# Patient Record
Sex: FEMALE | Race: WHITE | NOT HISPANIC OR LATINO | Employment: PART TIME | URBAN - METROPOLITAN AREA
[De-identification: names, ages, dates, MRNs, and addresses within clinical notes are randomized per-mention and may not be internally consistent; named-entity substitution may affect disease eponyms.]

---

## 2020-02-04 ENCOUNTER — OFFICE VISIT - HEALTHEAST (OUTPATIENT)
Dept: SURGERY | Facility: CLINIC | Age: 60
End: 2020-02-04

## 2020-02-04 ENCOUNTER — AMBULATORY - HEALTHEAST (OUTPATIENT)
Dept: LAB | Facility: CLINIC | Age: 60
End: 2020-02-04

## 2020-02-04 DIAGNOSIS — E88.810 METABOLIC SYNDROME: ICD-10-CM

## 2020-02-04 DIAGNOSIS — E78.5 DYSLIPIDEMIA: ICD-10-CM

## 2020-02-04 DIAGNOSIS — M19.90 ARTHRITIS: ICD-10-CM

## 2020-02-04 DIAGNOSIS — R13.10 DYSPHAGIA, UNSPECIFIED TYPE: ICD-10-CM

## 2020-02-04 DIAGNOSIS — K76.0 FATTY LIVER: ICD-10-CM

## 2020-02-04 DIAGNOSIS — Z98.84 HISTORY OF LAPAROSCOPIC ADJUSTABLE GASTRIC BANDING: ICD-10-CM

## 2020-02-04 DIAGNOSIS — E11.9 TYPE 2 DIABETES MELLITUS WITHOUT COMPLICATION, WITHOUT LONG-TERM CURRENT USE OF INSULIN (H): ICD-10-CM

## 2020-02-04 DIAGNOSIS — N39.3 STRESS INCONTINENCE OF URINE: ICD-10-CM

## 2020-02-04 DIAGNOSIS — R11.10 VOMITING, INTRACTABILITY OF VOMITING NOT SPECIFIED, PRESENCE OF NAUSEA NOT SPECIFIED, UNSPECIFIED VOMITING TYPE: ICD-10-CM

## 2020-02-04 DIAGNOSIS — K74.60 CIRRHOSIS OF LIVER WITHOUT ASCITES, UNSPECIFIED HEPATIC CIRRHOSIS TYPE (H): ICD-10-CM

## 2020-02-04 DIAGNOSIS — F32.A DEPRESSION, UNSPECIFIED DEPRESSION TYPE: ICD-10-CM

## 2020-02-04 DIAGNOSIS — K21.9 GASTROESOPHAGEAL REFLUX DISEASE WITHOUT ESOPHAGITIS: ICD-10-CM

## 2020-02-04 DIAGNOSIS — M54.50 LOW BACK PAIN, UNSPECIFIED BACK PAIN LATERALITY, UNSPECIFIED CHRONICITY, UNSPECIFIED WHETHER SCIATICA PRESENT: ICD-10-CM

## 2020-02-04 LAB
ERYTHROCYTE [DISTWIDTH] IN BLOOD BY AUTOMATED COUNT: 13.3 % (ref 11–14.5)
FERRITIN SERPL-MCNC: 9 NG/ML (ref 10–130)
FOLATE SERPL-MCNC: 15.1 NG/ML
HCT VFR BLD AUTO: 32.8 % (ref 35–47)
HGB BLD-MCNC: 10.7 G/DL (ref 12–16)
MCH RBC QN AUTO: 25.5 PG (ref 27–34)
MCHC RBC AUTO-ENTMCNC: 32.6 G/DL (ref 32–36)
MCV RBC AUTO: 78 FL (ref 80–100)
PLATELET # BLD AUTO: 170 THOU/UL (ref 140–440)
PMV BLD AUTO: 13.1 FL (ref 8.5–12.5)
PTH-INTACT SERPL-MCNC: 70 PG/ML (ref 10–86)
RBC # BLD AUTO: 4.2 MILL/UL (ref 3.8–5.4)
TSH SERPL DL<=0.005 MIU/L-ACNC: 2.6 UIU/ML (ref 0.3–5)
VIT B12 SERPL-MCNC: 394 PG/ML (ref 213–816)
WBC: 6.3 THOU/UL (ref 4–11)

## 2020-02-04 RX ORDER — BUPROPION HYDROCHLORIDE 150 MG/1
150 TABLET ORAL
Status: SHIPPED | COMMUNITY
Start: 2019-10-03

## 2020-02-04 RX ORDER — ESCITALOPRAM OXALATE 20 MG/1
20 TABLET ORAL
Status: SHIPPED | COMMUNITY
Start: 2019-10-03

## 2020-02-04 RX ORDER — GLIPIZIDE 5 MG/1
TABLET, FILM COATED, EXTENDED RELEASE ORAL
Status: SHIPPED | COMMUNITY
Start: 2019-11-04

## 2020-02-04 RX ORDER — HYDROCHLOROTHIAZIDE 12.5 MG/1
12.5 TABLET ORAL
Status: SHIPPED | COMMUNITY
Start: 2019-09-30 | End: 2020-09-29

## 2020-02-04 RX ORDER — METOPROLOL SUCCINATE 100 MG/1
100 TABLET, EXTENDED RELEASE ORAL
Status: SHIPPED | COMMUNITY
Start: 2019-09-30

## 2020-02-04 RX ORDER — PRAVASTATIN SODIUM 20 MG
20 TABLET ORAL
Status: SHIPPED | COMMUNITY
Start: 2019-09-30

## 2020-02-04 RX ORDER — OXYBUTYNIN CHLORIDE 10 MG/1
TABLET, EXTENDED RELEASE ORAL
Status: SHIPPED | COMMUNITY
Start: 2019-11-04

## 2020-02-04 ASSESSMENT — MIFFLIN-ST. JEOR: SCORE: 1582.4

## 2020-02-05 LAB
25(OH)D3 SERPL-MCNC: 58.9 NG/ML (ref 30–80)
C PEPTIDE SERPL-MCNC: 6.7 NG/ML (ref 0.9–6.9)
ZINC SERPL-MCNC: 73.3 UG/DL (ref 60–120)

## 2020-02-06 LAB
ANNOTATION COMMENT IMP: NORMAL
VIT A SERPL-MCNC: 0.41 MG/L (ref 0.3–1.2)
VIT B1 PYROPHOSHATE BLD-SCNC: 87 NMOL/L (ref 70–180)
VITAMIN A (RETINYL PALMITATE): 0.03 MG/L (ref 0–0.1)

## 2020-02-07 ENCOUNTER — COMMUNICATION - HEALTHEAST (OUTPATIENT)
Dept: SURGERY | Facility: CLINIC | Age: 60
End: 2020-02-07

## 2020-02-12 ENCOUNTER — AMBULATORY - HEALTHEAST (OUTPATIENT)
Dept: SURGERY | Facility: CLINIC | Age: 60
End: 2020-02-12

## 2020-03-04 ENCOUNTER — OFFICE VISIT - HEALTHEAST (OUTPATIENT)
Dept: SURGERY | Facility: CLINIC | Age: 60
End: 2020-03-04

## 2020-03-04 DIAGNOSIS — E66.01 MORBID OBESITY (H): ICD-10-CM

## 2020-03-05 ENCOUNTER — OFFICE VISIT - HEALTHEAST (OUTPATIENT)
Dept: SURGERY | Facility: CLINIC | Age: 60
End: 2020-03-05

## 2020-03-05 DIAGNOSIS — E66.812 OBESITY, CLASS II, BMI 35-39.9, ISOLATED (SEE ACTUAL BMI): ICD-10-CM

## 2020-03-05 DIAGNOSIS — E11.9 TYPE 2 DIABETES MELLITUS WITHOUT COMPLICATION, WITHOUT LONG-TERM CURRENT USE OF INSULIN (H): ICD-10-CM

## 2020-03-05 ASSESSMENT — MIFFLIN-ST. JEOR: SCORE: 1597.37

## 2020-03-18 ENCOUNTER — OFFICE VISIT - HEALTHEAST (OUTPATIENT)
Dept: SURGERY | Facility: CLINIC | Age: 60
End: 2020-03-18

## 2020-03-18 DIAGNOSIS — E66.01 MORBID OBESITY (H): ICD-10-CM

## 2020-04-01 ENCOUNTER — OFFICE VISIT - HEALTHEAST (OUTPATIENT)
Dept: SURGERY | Facility: CLINIC | Age: 60
End: 2020-04-01

## 2020-04-01 DIAGNOSIS — E66.812 OBESITY, CLASS II, BMI 35-39.9, ISOLATED (SEE ACTUAL BMI): ICD-10-CM

## 2020-04-01 DIAGNOSIS — K76.0 FATTY LIVER: ICD-10-CM

## 2020-04-01 DIAGNOSIS — Z71.3 NUTRITIONAL COUNSELING: ICD-10-CM

## 2020-04-01 DIAGNOSIS — E11.9 TYPE 2 DIABETES MELLITUS WITHOUT COMPLICATION, WITHOUT LONG-TERM CURRENT USE OF INSULIN (H): ICD-10-CM

## 2020-04-01 ASSESSMENT — MIFFLIN-ST. JEOR: SCORE: 1573.33

## 2020-05-22 ENCOUNTER — SURGERY - HEALTHEAST (OUTPATIENT)
Dept: SURGERY | Facility: CLINIC | Age: 60
End: 2020-05-22

## 2020-05-22 DIAGNOSIS — Z12.11 SPECIAL SCREENING FOR MALIGNANT NEOPLASMS, COLON: ICD-10-CM

## 2020-05-27 ENCOUNTER — AMBULATORY - HEALTHEAST (OUTPATIENT)
Dept: SURGERY | Facility: CLINIC | Age: 60
End: 2020-05-27

## 2020-05-27 DIAGNOSIS — Z11.59 ENCOUNTER FOR SCREENING FOR OTHER VIRAL DISEASES: ICD-10-CM

## 2020-05-28 ASSESSMENT — MIFFLIN-ST. JEOR: SCORE: 1589.2

## 2020-05-31 ENCOUNTER — AMBULATORY - HEALTHEAST (OUTPATIENT)
Dept: FAMILY MEDICINE | Facility: CLINIC | Age: 60
End: 2020-05-31

## 2020-05-31 DIAGNOSIS — Z11.59 ENCOUNTER FOR SCREENING FOR OTHER VIRAL DISEASES: ICD-10-CM

## 2020-06-02 ENCOUNTER — ANESTHESIA - HEALTHEAST (OUTPATIENT)
Dept: SURGERY | Facility: AMBULATORY SURGERY CENTER | Age: 60
End: 2020-06-02

## 2020-06-03 ENCOUNTER — SURGERY - HEALTHEAST (OUTPATIENT)
Dept: SURGERY | Facility: AMBULATORY SURGERY CENTER | Age: 60
End: 2020-06-03

## 2020-06-03 ASSESSMENT — MIFFLIN-ST. JEOR: SCORE: 1575.59

## 2020-06-08 ENCOUNTER — COMMUNICATION - HEALTHEAST (OUTPATIENT)
Dept: SURGERY | Facility: CLINIC | Age: 60
End: 2020-06-08

## 2020-06-09 ENCOUNTER — AMBULATORY - HEALTHEAST (OUTPATIENT)
Dept: SURGERY | Facility: CLINIC | Age: 60
End: 2020-06-09

## 2020-06-19 ENCOUNTER — COMMUNICATION - HEALTHEAST (OUTPATIENT)
Dept: SURGERY | Facility: CLINIC | Age: 60
End: 2020-06-19

## 2020-06-25 ENCOUNTER — AMBULATORY - HEALTHEAST (OUTPATIENT)
Dept: SURGERY | Facility: CLINIC | Age: 60
End: 2020-06-25

## 2020-07-16 ENCOUNTER — OFFICE VISIT - HEALTHEAST (OUTPATIENT)
Dept: SURGERY | Facility: CLINIC | Age: 60
End: 2020-07-16

## 2020-07-16 ENCOUNTER — AMBULATORY - HEALTHEAST (OUTPATIENT)
Dept: SURGERY | Facility: CLINIC | Age: 60
End: 2020-07-16

## 2020-07-16 DIAGNOSIS — K21.9 GASTROESOPHAGEAL REFLUX DISEASE WITHOUT ESOPHAGITIS: ICD-10-CM

## 2020-07-16 DIAGNOSIS — E88.810 METABOLIC SYNDROME: ICD-10-CM

## 2020-07-16 DIAGNOSIS — K74.60 CIRRHOSIS OF LIVER WITHOUT ASCITES, UNSPECIFIED HEPATIC CIRRHOSIS TYPE (H): ICD-10-CM

## 2020-07-16 DIAGNOSIS — E11.9 TYPE 2 DIABETES MELLITUS WITHOUT COMPLICATION, WITHOUT LONG-TERM CURRENT USE OF INSULIN (H): ICD-10-CM

## 2020-07-16 DIAGNOSIS — Z98.84 HISTORY OF LAPAROSCOPIC ADJUSTABLE GASTRIC BANDING: ICD-10-CM

## 2020-07-16 RX ORDER — METFORMIN HYDROCHLORIDE 750 MG/1
2250 TABLET, EXTENDED RELEASE ORAL
Status: SHIPPED | COMMUNITY
Start: 2020-07-07

## 2020-07-16 ASSESSMENT — MIFFLIN-ST. JEOR: SCORE: 1611.88

## 2020-07-30 ENCOUNTER — COMMUNICATION - HEALTHEAST (OUTPATIENT)
Dept: SURGERY | Facility: CLINIC | Age: 60
End: 2020-07-30

## 2020-07-31 ENCOUNTER — COMMUNICATION - HEALTHEAST (OUTPATIENT)
Dept: SURGERY | Facility: CLINIC | Age: 60
End: 2020-07-31

## 2021-06-04 VITALS — BODY MASS INDEX: 36.82 KG/M2 | WEIGHT: 221 LBS | HEIGHT: 65 IN

## 2021-06-04 VITALS
DIASTOLIC BLOOD PRESSURE: 72 MMHG | HEIGHT: 66 IN | BODY MASS INDEX: 36.32 KG/M2 | SYSTOLIC BLOOD PRESSURE: 110 MMHG | WEIGHT: 226 LBS

## 2021-06-04 VITALS
RESPIRATION RATE: 14 BRPM | DIASTOLIC BLOOD PRESSURE: 66 MMHG | BODY MASS INDEX: 37.15 KG/M2 | HEART RATE: 60 BPM | WEIGHT: 223 LBS | OXYGEN SATURATION: 97 % | HEIGHT: 65 IN | SYSTOLIC BLOOD PRESSURE: 122 MMHG

## 2021-06-04 VITALS — WEIGHT: 226.3 LBS | HEIGHT: 65 IN | BODY MASS INDEX: 37.7 KG/M2

## 2021-06-04 VITALS — WEIGHT: 218 LBS | BODY MASS INDEX: 35.03 KG/M2 | HEIGHT: 66 IN

## 2021-06-05 NOTE — PATIENT INSTRUCTIONS - HE
Considering Weight Loss Surgery? Go to [email or web address] - then follow instructions under [facility]inar to watch our video.

## 2021-06-05 NOTE — PROGRESS NOTES
"New Bariatric Surgery Consultation Note    2020    RE: Pat Barraza  MR#: [id number]  : 1960      Referring provider: Dr. Germain Baxter  Chief Complaint/Reason for visit: evaluation for possible revisional weight loss surgery: Lap band to RYGB    Dear Sahara Brand CNP,    I had the pleasure of seeing your patient, Pat Barraza, to evaluate her obesity and consider her for possible weight loss surgery. As you know, Pat Barraza is 59 y.o..  She has a height of Height: 5' 5\" (1.651 m)  , a weight of 233 pounds. Her BMI is 37.11        HISTORY OF PRESENT ILLNESS:  Pat had a lap band placed by Dr. Jackson in . Her weight was 232# and her BMI was 37.4. She had FLAKITA and HTN prior to her lap band.   She has developed maladaptive eating, dysphagia and vomiting. She saw [facility] and had all of the saline removed from her lap band. The fluid was cloudy.  She also had an EGD 2020 which is in EPIC which did not show band erosion or ulceration and H. Pylori was negative. There was no mention of esophageal varices.  She was put on PPI and carafate which she continues now. She has been taking vitamin D and was taking a MVI. She developed DM2 4 yrs after her surgery. Her A1c was 5.8 recently on glipizide 5mg daily and metformin 2250 mg daily.    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     LIMITED ABDOMINAL ULTRASOUND (FIBROSCAN)    DATE: 2020 8:58 AM        INDICATION: Nonalcoholic fatty liver disease    COMPARISON: CT 2020        FINDINGS:     Transient ultrasound elastography performed and measurements were obtained of the liver. 11 total measurements were obtained and 11 measurements were utilized. IQR is 14%.        Liver stiffness is measured at 22.1 kPa which is consistent with F4, cirrhosis.        The CAP is 271 dB/M which is consistent with S2, moderate steatosis.            CO-MORBIDITIES OF OBESITY INCLUDE:  Patient Active Problem List "   Diagnosis     Depression     Arthritis     Fatty liver     GERD (gastroesophageal reflux disease)     Urinary incontinence     Dyslipidemia     Diabetes mellitus (H)     Low back pain     Metabolic syndrome     Low iron stores     Low serum vitamin B12 < 400     Iron deficiency anemia     Cirrhosis (H)       In summary, Pat Barraza has had a Lap Band placed by Dr. Jackson in 2011. She developed maladaptive eating and vomiting and dysphagia and has had the fluid removed in its entirety by MD at [facility]. She has morbid obesity  with a BMI 37.11 kg/m2 and the comorbidities stated above. She completed an informational seminar and would like a revision from Lap Band to RYGB. She hopes to see resolution of her vomiting, maladaptive eating, and DM.  Once Pat has been cleared by the dietitian and the psychologist, completed her initial labs, attended one support group, had her pap done, and attended an information seminar or completed it online and there are no further recommendations, Pat will be scheduled with Dr. Baxter for consultation on the Lap Band to RYGB Revision surgery. Patient verbalizes understanding of the process to surgery and expectations for the postoperative period including the need for lifelong lifestyle changes, vitamin supplementation, and laboratory monitoring.    PAST MEDICAL HISTORY:  Past Medical History:   Diagnosis Date     Arthritis     left knee replacement     Cirrhosis (H) 02/2020     Depression      Diabetes mellitus (H)      Dyslipidemia      Fatty liver      GERD (gastroesophageal reflux disease)      Low back pain      Metabolic syndrome      Sleep apnea      Urinary incontinence        PAST SURGICAL HISTORY:  Past Surgical History:   Procedure Laterality Date     CHOLECYSTECTOMY       COLONOSCOPY       JOINT REPLACEMENT      left knee     LAPAROSCOPIC GASTRIC BANDING       TENDON REPAIR      left 2nd digit     TOE SURGERY Bilateral     pinning for osteoarthritis     TUBAL LIGATION        UPPER GASTROINTESTINAL ENDOSCOPY         FAMILY HISTORY:   Family History   Problem Relation Age of Onset     No Medical Problems Mother      Stroke Father      Obesity Father      Obesity Brother        SOCIAL HISTORY:   . Two children (one hers/one his) Her son is in his 40's  Working [employer] in the am and [employer] in the PM.   Lives at home with her  and pets  HABITS:  Smoked for 38 yrs up to 1ppd. Quit at age 50  Does not drink alcohol or use recreation drugs    PSYCHOLOGICAL HISTORY:   Depression well controlled with Lexapro and Wellbutrin  ROS:  GERD/Vomiting despite all of fluid removed from lap band placed by Dr. Jackson in 2011  EATING BEHAVIORS:  Maladaptive eating d/t lap band-eats smooth, liquid, soft foods.  EXERCISE:    MEDICATIONS:  Current Outpatient Medications   Medication Sig Dispense Refill     buPROPion (WELLBUTRIN XL) 150 MG 24 hr tablet Take 150 mg by mouth.       calcium citrate-vitamin D3 500 mg calcium -400 unit Chew Chew 2 tablets.       escitalopram oxalate (LEXAPRO) 20 MG tablet Take 20 mg by mouth.       glipiZIDE (GLUCOTROL XL) 5 MG 24 hr tablet TAKE 1 TABLET BY MOUTH EVERY DAY       hydroCHLOROthiazide (HYDRODIURIL) 12.5 MG tablet Take 12.5 mg by mouth.       metoprolol succinate (TOPROL-XL) 100 MG 24 hr tablet Take 100 mg by mouth.       omeprazole (PRILOSEC) 20 MG capsule Take 20 mg by mouth.       oxybutynin (DITROPAN XL) 10 MG ER tablet TAKE 1 TABLET BY MOUTH EVERY DAY       pravastatin (PRAVACHOL) 20 MG tablet Take 20 mg by mouth.       sucralfate (CARAFATE) 1 gram tablet Take 1 g by mouth.       No current facility-administered medications for this visit.        ALLERGIES:  Allergies   Allergen Reactions     Ace Inhibitors Cough       LABS/IMAGING/MEDICAL RECORDS REVIEW: 11/7/2011 op report reviewed. Labs reviewed. BX neg for H. Pylori during 1/2020 EGD.    PHYSICAL EXAM:  Vitals:    02/04/20 1225   BP: 122/66   Pulse: 60   Resp: 14   SpO2: 97%     Pleasant in no  "distress  Tattoos arms  Mallampati 1+  Neck 16\"  Heart regular without murmur  Lungs: clear  Abd: lap band port easily palpable LUQ, large génesis scar oblique/horizontal laparoscopic lap band scar        In summary, Pat Barraza has had a Lap Band placed by Dr. Jackson in 2011. She developed maladaptive eating and vomiting and dysphagia and has had the fluid removed in its entirety by MD at [facility]. She has morbid obesity  with a BMI 37.11 kg/m2 and the comorbidities stated above. She completed an informational seminar and would like a revision from Lap Band to RYGB. She hopes to see resolution of her vomiting, maladaptive eating, and DM.  Once Pat has been cleared by the dietitian and the psychologist, completed her initial labs, attended one support group, had her pap done, and attended an information seminar or completed it online and there are no further recommendations, the pt will be allowed to see the surgeon of her choice for consultation on the Lap Band to RYGB Revision surgery. Patient verbalizes understanding of the process to surgery and expectations for the postoperative period including the need for lifelong lifestyle changes, vitamin supplementation and laboratory monitoring.    Sincerely,     Cara Mc MD    I spent a total of 45 minutes face to face with the patient during today's office visit. Over 50% of this time was spent counseling the patient and/or coordinating care.  "
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
## 2021-06-06 NOTE — PROGRESS NOTES
Initial Structured Weight Loss Supervised Diet Evaluation     Assessment:  Pt. is being seen today for initial RD nutritional evaluation. Pt. has been unsuccessful with non-surgical weight loss methods and is interested in bariatric surgery. Today we reviewed current eating habits and level of physical activity, and instructed on the changes that are required for successful bariatric outcomes.    Surgery of interest per pt: Revision.    Workflow review:  Support Group: Completed.  Psychology:In progress.  Lab work:Completed.  SWL:No       Weight goal: At or below initial.    Patient Active Problem List:  Patient Active Problem List   Diagnosis     Depression     Arthritis     Fatty liver     GERD (gastroesophageal reflux disease)     Urinary incontinence     Dyslipidemia     Diabetes mellitus (H)     Low back pain     Metabolic syndrome     Low iron stores     Low serum vitamin B12 < 400     Iron deficiency anemia     Cirrhosis (H)       Pt's Initial Weight: 223 lbs  Weight: (!) 226 lb 4.8 oz (102.6 kg)  Weight loss from initial: -3.3  % Weight loss: -1.48 %    BMI: Body mass index is 37.66 kg/m .    Estimated RMR ([place]-St Jeor equation): 1607 calories    Food allergies, intolerances, and Judaism customs: NKFA    Diabetic: Yes  HbA1c:  No results found for: HGBA1C  DM Meds currently taking: metformin and glipizide- 85-90s  Vitamins currently taking: flinstones with iron, B12, D      Diet/Weight History  Method or Diet: lap band    Socioeconomic Status:  Who does the grocery shopping for your household? self    Who prepares your meals at home? Self and     Diet Recall/Time:   Breakfast: frosted mini wheats and milk (8g)  Am Snack: grape juice  Lunch: 1/2 cup cottage cheese (15g) and yogurt (7g)  Pm snack:none  Dinner: 2 pieces of pizza  HS Snack: 3 reeses peanut butter eggs    Overnight eating: No    Per Diet recall estimated protein: 40 grams    Meals per week away from home: sit down- 3 times per  week    Beverages (Type/Oz. per day)  Water: 50oz  Coffee: none  Speciality Coffee: none  Milk: with cereal  Soda: none  Alcohol: none  Other: juice occasionally    Exercise  Type: none  Patient would like to go back to the gym to ride the bike    PES statement:   1. (NI-1.3)Excessive energy intake related to Food and nutrition related knowledge deficit concerning excessive energy/oral intake as evidenced by Intake of high caloric density foods/beverages (juice, soda, alcohol) at meals and/or snacks; large portions; frequent grazing; Estimated intake that exceeds estimated daily energy intake; Binge eating patterns; Frequent excessive fast food or restaurant intake; and BMI 37.66     Intervention    Nutrition Education:   1. Provided general overview of diet and lifestyle modifications needed to be a deemed a safe candidate for bariatric surgery.   2. Educated pt on how to read a food label: choosing foods with than 10 grams fat and 10 grams sugar per serving to avoid dumping syndrome.  3. Dumping Syndrome: Described the mechanisms of syndrome, symptoms, and prevention tools from a dietary perspective.   4. Vitamins: Educated on post-op vitamin regimen including MVI+ 18 mg Fe two times a day, calcium citrate 400-600 mg two times a day, 3754-5821 mcg sublingual B12 daily, 5000 IU vitamin D3 daily.     Food/Nutrient Delivery:  5. Educated pt on eating three meals, with cutting out snacking.  6. Bariatric Plate: Pt and I discussed the importance of including a lean protein source (20-30 grams/meal), vegetables (included at lunch and dinner), one serving (15g) of carbohydrate, and limited added fat (1 tb/day) at each meal.   7. Educated pt on how to complete a food journal and benefits of meal planning.   8. Educated pt on using a protein powder drink as a meal replacement and/or supplement after bariatric surgery.   9. Discussed importance of adequate hydration after surgery, with goal of at least 64 oz of  fluids/day.  10. Addressed avoiding all carbonated, caffeinated and sweetened drinks to prepare for bariatric surgery.     Nutrition Counselin. Mindful eating techniques: Encouraged slow meal pace, chewing foods to applesauce consistency for 20-30 minutes/meal.   12. Discussed  fluids 30 minutes before, during, and after meal to prevent dumping syndrome and discomfort post bariatric surgery.       Instructions/Goals:     1. Fill out food journal and return at follow up.    Handouts Provided:   Pt. [facility] Patient Handbook  Bariatric Plate  Food journal      Monitor/Evaluation:  Pt. s target weight: no gain from initial visit, pt. verbalized understanding.     Plan for next visit:   Review Bariatric plate and food journal homework.  (Final Supervised Diet visit with RD) pre/post-op  diet progression, give review of surgery process.  Review [place] to Bariatric Success    Visit length: 30 minutes.     ABN signed: Yes

## 2021-06-06 NOTE — PROGRESS NOTES
Health and Behavior Assessment with Intervention, Initial (60 minutes): Met with patient 1:1 to obtain demographics and background information, reasons for surgery, typical eating pattern/fluid intake as well as psychiatric history. Celsa is a 59-year-old  female who originally had a laparoscopic adjustable gastric band with Dr. Jackson in 2011 but has been getting sick off of it.  She was also not able to get regular fills because of insurance issues.  She is now looking to have it removed and converted to a Mariia-en-Y.  She has struggled with her weight for much of her life and now is concerned about various comorbidities.  She has a history of depression and anxiety for which she is well medicated.  She has a history of stress, emotional and boredom eating.  She has good knowledge of the surgery and good support.  She will follow-up and complete psychological testing.  Diagnoses: F 32.9; F 41.9; E 66.01

## 2021-06-06 NOTE — PROGRESS NOTES
Attended Support Group. Workflow updated.    Eula Reeder CMA  [facility]   P: [phone]  F: [phone]

## 2021-06-08 NOTE — ANESTHESIA POSTPROCEDURE EVALUATION
Patient: Pat JARA Barraza  Procedure(s):  COLONOSCOPY with viopsy  Anesthesia type: MAC    Patient location: PACU  Last vitals:   Vitals Value Taken Time   BP 96/57 6/3/2020  2:30 PM   Temp 36.2  C (97.2  F) 6/3/2020  2:13 PM   Pulse 63 6/3/2020  2:41 PM   Resp 16 6/3/2020  2:30 PM   SpO2 94 % 6/3/2020  2:41 PM   Vitals shown include unvalidated device data.  Post vital signs: stable  Level of consciousness: awake and responds to simple questions  Post-anesthesia pain: pain controlled  Post-anesthesia nausea and vomiting: no  Pulmonary: unassisted, return to baseline  Cardiovascular: stable and blood pressure at baseline  Hydration: adequate  Anesthetic events: no    QCDR Measures:  ASA# 11 - Hien-op Cardiac Arrest: ASA11B - Patient did NOT experience unanticipated cardiac arrest  ASA# 12 - Hien-op Mortality Rate: ASA12B - Patient did NOT die  ASA# 13 - PACU Re-Intubation Rate: ASA13B - Patient did NOT require a new airway mgmt  ASA# 10 - Composite Anes Safety: ASA10A - No serious adverse event    Additional Notes:

## 2021-06-08 NOTE — PROGRESS NOTES
Attended Support Group. Workflow updated.    Eula Reeder CMA  [facility]   P: [phone]  F: [phone]

## 2021-06-08 NOTE — ANESTHESIA PREPROCEDURE EVALUATION
Anesthesia Evaluation      Patient summary reviewed   No history of anesthetic complications     Airway   Mallampati: II  Neck ROM: full   Pulmonary     breath sounds clear to auscultation  (+) sleep apnea, a smoker    ROS comment: Remote smoking history, quit 10 years ago                         Cardiovascular   Exercise tolerance: > or = 4 METS  (+) hypertension, ,     Rhythm: regular  Rate: normal,         Neuro/Psych    (+) depression,   (-) no seizures, no CVA    Endo/Other    (+) diabetes mellitus, arthritis, obesity,   (-) hypothyroidism     Comments: BMI 36    GI/Hepatic/Renal    (+) GERD well controlled,   impaired hepatic function    Comments: Fatty liver, cirrhosis      Other findings:   Covid negative 5/31    Hbg 11  Plt 109    INR 1.1    Na 141  K 3.9  BUN/Cr 21/0.81    LFTs wnl      Dental    (+) upper dentures and lower dentures                       Anesthesia Plan  Planned anesthetic: MAC  Propofol gtt ONLY  ASA 3     Anesthetic plan and risks discussed with: patient    Post-op plan: routine recovery

## 2021-06-08 NOTE — ANESTHESIA CARE TRANSFER NOTE
Last vitals:   Vitals:    06/03/20 1413   BP: 103/50   Pulse: 69   Resp: 14   Temp: 36.2  C (97.2  F)   SpO2: 93%     Patient's level of consciousness is drowsy  Spontaneous respirations: yes  Maintains airway independently: yes  Dentition unchanged: yes  Oropharynx: oropharynx clear of all foreign objects    QCDR Measures:  ASA# 20 - Surgical Safety Checklist: WHO surgical safety checklist completed prior to induction    PQRS# 430 - Adult PONV Prevention: 4558F - Pt received => 2 anti-emetic agents (different classes) preop & intraop  ASA# 8 - Peds PONV Prevention: NA - Not pediatric patient, not GA or 2 or more risk factors NOT present  PQRS# 424 - Hien-op Temp Management: NA - MAC anesthesia or case < 60 minutes  PQRS# 426 - PACU Transfer Protocol: - Transfer of care checklist used  ASA# 14 - Acute Post-op Pain: ASA14B - Patient did NOT experience pain >= 7 out of 10

## 2021-06-08 NOTE — PROGRESS NOTES
Attended Support Group. Workflow updated.    Eula Reeder CMA  [facility]   P: [phone]  F: [phone]

## 2021-06-09 NOTE — PROGRESS NOTES
I have submitted a prior authorization request on behalf of this patient to [employer] to be approved for a conversion of her AGB to a LRNY with Dr. Julian Contreras

## 2021-06-09 NOTE — PROGRESS NOTES
HPI: Pat Barraza is a 59 y.o. female here today for consideration of metabolic and bariatric surgery. She is referred by Sahara Brand.  She has struggled with obesity for decades, and underwent a laparoscopic adjustable band placement in 2011.  At that time her weight was 232 pounds, with hypertension and obstructive sleep apnea as her comorbidities.  Following that procedure, she developed complications including nausea and vomiting, and dysphasia.  This was in spite of all fluid being aspirated out of her band 6 months ago, though the frequency of vomiting is markedly improved following that aspiration.  She also failed to lose a significant amount of weight, ultimately developing type 2 diabetes in 2015.  More recently she was diagnosed with cirrhosis of the liver based on ultrasound elastography.  She is interested in conversion of her laparoscopic adjustable band to a Mariia-en-Y gastric bypass with a goal of improving her vomiting, type 2 diabetes, and other comorbidities.    Her bariatric comorbidities include hypertension, type 2 diabetes, nonalcoholic fatty liver disease progressing to cirrhosis, anabolic syndrome, obstructive sleep apnea, arthritis involving the knees, and mild gastroesophageal reflux disease.    Allergies:Ace inhibitors    Past Medical History:   Diagnosis Date     Anxiety      Arthritis     left knee replacement     Cirrhosis (H) 02/2020     Depression      Diabetes mellitus (H)      Dyslipidemia      Fatty liver      GERD (gastroesophageal reflux disease)      Hypertension      Low back pain      Metabolic syndrome      Sleep apnea      Urinary incontinence        Past Surgical History:   Procedure Laterality Date     CHOLECYSTECTOMY       COLONOSCOPY       JOINT REPLACEMENT      left knee     LAPAROSCOPIC GASTRIC BANDING       OR COLONOSCOPY FLX DX W/COLLJ SPEC WHEN PFRMD N/A 6/3/2020    Procedure: COLONOSCOPY with viopsy;  Surgeon: Julian Contreras MD;  Location: [facility];   Service: General     TENDON REPAIR      left 2nd digit     TOE SURGERY Bilateral     pinning for osteoarthritis     TUBAL LIGATION       UPPER GASTROINTESTINAL ENDOSCOPY         CURRENT MEDS:  Current Outpatient Medications   Medication Sig Dispense Refill     buPROPion (WELLBUTRIN XL) 150 MG 24 hr tablet Take 150 mg by mouth.       calcium citrate-vitamin D3 500 mg calcium -400 unit Chew Chew 2 tablets.       escitalopram oxalate (LEXAPRO) 20 MG tablet Take 20 mg by mouth.       glipiZIDE (GLUCOTROL XL) 5 MG 24 hr tablet TAKE 1 TABLET BY MOUTH EVERY DAY       hydroCHLOROthiazide (HYDRODIURIL) 12.5 MG tablet Take 12.5 mg by mouth.       metoprolol succinate (TOPROL-XL) 100 MG 24 hr tablet Take 100 mg by mouth.       omeprazole (PRILOSEC) 20 MG capsule Take 20 mg by mouth.       oxybutynin (DITROPAN XL) 10 MG ER tablet TAKE 1 TABLET BY MOUTH EVERY DAY       pravastatin (PRAVACHOL) 20 MG tablet Take 20 mg by mouth.       sucralfate (CARAFATE) 1 gram tablet Take 1 g by mouth.       No current facility-administered medications for this visit.          Family History   Problem Relation Age of Onset     No Medical Problems Mother      Stroke Father      Obesity Father      Obesity Brother         reports that she has quit smoking. She has a 38.00 pack-year smoking history. She has never used smokeless tobacco. She reports previous drug use. Drug: Methamphetamines. She reports that she does not drink alcohol.    Review of Systems -  A complete ROS was reviewed and except for what is listed in the HPI above, all others are negative  PSYCHIATRIC: She has undergone a lifestyle assessment and has been deemed a good candidate for bariatric surgery by the psychologist.    There were no vitals taken for this visit.  Wt Readings from Last 3 Encounters:   06/03/20 218 lb (98.9 kg)   04/01/20 221 lb (100.2 kg)   03/05/20 (!) 226 lb 4.8 oz (102.6 kg)     There is no height or weight on file to calculate BMI.    EXAM:  GENERAL: This  is a well-developed 59 y.o. female who appears her stated age  HEAD & NECK: Grossly normal.  No visible goiter or scars  CARDIAC: RRR without murmur  CHEST/LUNG:  Clear to auscultation  ABDOMEN: Obese.  Nontender.  No hernias or masses appreciated.  Right upper quadrant Kocher incision from previous open cholecystectomy.  LYMPHATIC:  No significant adenopathy appreciated.    EXTREMITIES: Grossly normal.  No evidence of chronic venous stasis.    NEUROLOGIC: Focally intact  INTEGUMENT: No open lesions or ulcers  PSYCHIATRIC: Normal affect. She has a good grasp on the nature of her obesity and the treatment options.    LABS:  Lab Results   Component Value Date    WBC 6.3 02/04/2020    HGB 10.7 (L) 02/04/2020    HCT 32.8 (L) 02/04/2020    MCV 78 (L) 02/04/2020     02/04/2020     INR/Prothrombin Time      No results found for: HGBA1C  No results found for: ALT, AST, GGT, ALKPHOS, BILITOT    Assessment/Plan: 59 y.o. female status post adjustable gastric band placement who has maintained her prior comorbidities, and actually developed new ones.  She is interested in conversion to gastric bypass in an attempt to improve her obesity related comorbidities.  We specifically discussed to the elevated risks of operating in patients with previous bariatric procedures, even more so in patients that have cirrhosis of the liver.  Nevertheless, I think conversion to a gastric bypass would be beneficial to her because of her significant comorbidities.    I went over the surgery in detail with her.  I went over the nature of the operation and some of the potential consequences of the surgery.  I went over the expected hospital course which I would anticipate will be slightly longer for her due to her cirrhosis, and discussed laparoscopic versus open surgery and the higher risk of conversion due to her previous surgeries, understanding that we will plan on doing this laparoscopically with the possibility of having to convert to  an open operation.  I went over some of the risks and complications of the operation including, but not limited to, DVT, pulmonary emboli, pneumonia, postoperative bleeding, wound infection, staple line leak, intra-abdominal sepsis, and possible death.  I also went over some of the potential nutritional concerns such as vitamin B-12, iron, vitamin D, vitamin A, calcium and protein deficiencies.  I will also went over the need for lifelong nutritional surveillance.  The patient understands and wants to proceed with surgery.  We will submit for prior authorization.      Julian Contreras MD  [facility] Department of Surgery

## 2021-06-09 NOTE — PROGRESS NOTES
Attended Support Group. Workflow updated.    Eula Reeder CMA  [facility]   P: [phone]  F: [phone]

## 2021-06-09 NOTE — TELEPHONE ENCOUNTER
Patient cleared by RD and Psych, workflow reviewed and updated.  She appears to be ready for an AB Consult.  Becky Perez RN

## 2021-06-10 NOTE — TELEPHONE ENCOUNTER
I spoke to Susan this morning about her recent denial.  Her insurance denied to to a plan exclusion for a revision.  She [facility]  I have asked her to apply with [facility] due to the reduction in hours for both her and her .  She was also laid off from a second job due to COVID  She will be getting back to me with what she finds out from them

## 2021-06-16 PROBLEM — D50.9 IRON DEFICIENCY ANEMIA: Status: ACTIVE | Noted: 2020-02-07

## 2021-06-16 PROBLEM — Z12.11 SPECIAL SCREENING FOR MALIGNANT NEOPLASMS, COLON: Status: ACTIVE | Noted: 2020-05-27

## 2021-06-16 PROBLEM — K74.60 CIRRHOSIS (H): Status: ACTIVE | Noted: 2020-02-01

## 2021-06-16 PROBLEM — Z98.84 HISTORY OF LAPAROSCOPIC ADJUSTABLE GASTRIC BANDING: Status: ACTIVE | Noted: 2020-11-16

## 2021-06-16 PROBLEM — R79.0 LOW IRON STORES: Status: ACTIVE | Noted: 2020-02-07

## 2021-06-16 PROBLEM — E53.8 LOW SERUM VITAMIN B12: Status: ACTIVE | Noted: 2020-02-07

## 2021-06-20 NOTE — LETTER
Letter by Becky Ybarra RN at      Author: Becky Ybarra RN Service: -- Author Type: --    Filed:  Encounter Date: 6/8/2020 Status: (Other)       6/8/2020    Pat Barraza  [address] [place] [address]    Re: Recent Colonoscopy    Dear Pat Barraza.    We are writing with results from your recent colonoscopy which showed:     A polyp identified as an adenoma with high-grade dysplasia. No cancer was seen in this lesion but because there is an elevated risk of developing colon cancer, we recommend your next colonoscopy to be in 5 years.    Polyps are growths from the lining of the colon. Some polyps have potential to progress on to become colon cancers. When polyps are identified during a colonoscopy we do our best to remove the entire polyp and send it to pathology for an evaluation under the microscope. The risk for developing colon cancer from a polyp is drastically reduced when that polyp is removed.       If any new concerns arise in the meantime, please contact your primary care provider for evaluation so your provider can help you decide if you need a colonscopy sooner. Some things to watch for include blood in your stool, stomach pain or cramping that does not resolve, or unexplained weight loss.    We sincerely appreciate the opportunity to provide your care. If you have any questions please feel free to contact us at [phone].    Sincerely,     Julian Contreras MD

## 2021-06-27 ENCOUNTER — HEALTH MAINTENANCE LETTER (OUTPATIENT)
Age: 61
End: 2021-06-27

## 2021-10-17 ENCOUNTER — HEALTH MAINTENANCE LETTER (OUTPATIENT)
Age: 61
End: 2021-10-17

## 2022-02-06 ENCOUNTER — HEALTH MAINTENANCE LETTER (OUTPATIENT)
Age: 62
End: 2022-02-06

## 2022-05-29 ENCOUNTER — HEALTH MAINTENANCE LETTER (OUTPATIENT)
Age: 62
End: 2022-05-29

## 2022-07-24 ENCOUNTER — HEALTH MAINTENANCE LETTER (OUTPATIENT)
Age: 62
End: 2022-07-24

## 2022-10-02 ENCOUNTER — HEALTH MAINTENANCE LETTER (OUTPATIENT)
Age: 62
End: 2022-10-02

## 2023-02-11 ENCOUNTER — HEALTH MAINTENANCE LETTER (OUTPATIENT)
Age: 63
End: 2023-02-11

## 2023-05-20 ENCOUNTER — HEALTH MAINTENANCE LETTER (OUTPATIENT)
Age: 63
End: 2023-05-20

## 2023-07-05 ENCOUNTER — HOSPITAL ENCOUNTER (OUTPATIENT)
Dept: ULTRASOUND IMAGING | Facility: HOSPITAL | Age: 63
Discharge: HOME | End: 2023-07-05
Attending: NURSE PRACTITIONER | Admitting: NURSE PRACTITIONER
Payer: COMMERCIAL

## 2023-07-05 DIAGNOSIS — K74.69 OTHER CIRRHOSIS OF LIVER (H): ICD-10-CM

## 2023-07-05 PROCEDURE — 76700 US EXAM ABDOM COMPLETE: CPT

## 2023-08-01 ENCOUNTER — HOSPITAL ENCOUNTER (OUTPATIENT)
Dept: MRI IMAGING | Facility: HOSPITAL | Age: 63
Discharge: HOME | End: 2023-08-01
Attending: NURSE PRACTITIONER | Admitting: NURSE PRACTITIONER
Payer: COMMERCIAL

## 2023-08-01 DIAGNOSIS — R77.2 ELEVATED AFP: ICD-10-CM

## 2023-08-01 DIAGNOSIS — K74.60 CIRRHOSIS OF LIVER WITHOUT ASCITES, UNSPECIFIED HEPATIC CIRRHOSIS TYPE (H): ICD-10-CM

## 2023-08-01 PROCEDURE — 74183 MRI ABD W/O CNTR FLWD CNTR: CPT

## 2023-08-01 PROCEDURE — 255N000002 HC RX 255 OP 636: Mod: JZ

## 2023-08-01 PROCEDURE — A9585 GADOBUTROL INJECTION: HCPCS | Mod: JZ

## 2023-08-01 RX ORDER — GADOBUTROL 604.72 MG/ML
0.1 INJECTION INTRAVENOUS ONCE
Status: COMPLETED | OUTPATIENT
Start: 2023-08-01 | End: 2023-08-01

## 2023-08-01 RX ADMIN — GADOBUTROL 10 ML: 604.72 INJECTION INTRAVENOUS at 11:43

## 2023-08-12 ENCOUNTER — HEALTH MAINTENANCE LETTER (OUTPATIENT)
Age: 63
End: 2023-08-12

## 2023-10-21 ENCOUNTER — HEALTH MAINTENANCE LETTER (OUTPATIENT)
Age: 63
End: 2023-10-21

## 2024-03-09 ENCOUNTER — HEALTH MAINTENANCE LETTER (OUTPATIENT)
Age: 64
End: 2024-03-09

## 2024-07-27 ENCOUNTER — HEALTH MAINTENANCE LETTER (OUTPATIENT)
Age: 64
End: 2024-07-27

## 2024-10-05 ENCOUNTER — HEALTH MAINTENANCE LETTER (OUTPATIENT)
Age: 64
End: 2024-10-05

## 2024-12-14 ENCOUNTER — HEALTH MAINTENANCE LETTER (OUTPATIENT)
Age: 64
End: 2024-12-14

## 2025-03-16 ENCOUNTER — HEALTH MAINTENANCE LETTER (OUTPATIENT)
Age: 65
End: 2025-03-16

## 2025-06-29 ENCOUNTER — HEALTH MAINTENANCE LETTER (OUTPATIENT)
Age: 65
End: 2025-06-29